# Patient Record
Sex: MALE | Race: NATIVE HAWAIIAN OR OTHER PACIFIC ISLANDER | NOT HISPANIC OR LATINO | Employment: FULL TIME | URBAN - METROPOLITAN AREA
[De-identification: names, ages, dates, MRNs, and addresses within clinical notes are randomized per-mention and may not be internally consistent; named-entity substitution may affect disease eponyms.]

---

## 2020-06-02 ENCOUNTER — OFFICE VISIT (OUTPATIENT)
Dept: URGENT CARE | Facility: PHYSICIAN GROUP | Age: 45
End: 2020-06-02
Payer: COMMERCIAL

## 2020-06-02 VITALS
HEART RATE: 74 BPM | HEIGHT: 70 IN | DIASTOLIC BLOOD PRESSURE: 74 MMHG | RESPIRATION RATE: 16 BRPM | TEMPERATURE: 98 F | SYSTOLIC BLOOD PRESSURE: 122 MMHG | OXYGEN SATURATION: 96 % | BODY MASS INDEX: 25.62 KG/M2 | WEIGHT: 179 LBS

## 2020-06-02 DIAGNOSIS — L08.9 SKIN INFECTION OF RIGHT KNEE: ICD-10-CM

## 2020-06-02 DIAGNOSIS — Z11.3 ROUTINE SCREENING FOR STI (SEXUALLY TRANSMITTED INFECTION): ICD-10-CM

## 2020-06-02 PROCEDURE — 99203 OFFICE O/P NEW LOW 30 MIN: CPT | Performed by: NURSE PRACTITIONER

## 2020-06-02 RX ORDER — DOXYCYCLINE HYCLATE 100 MG/1
100 CAPSULE ORAL 2 TIMES DAILY
Qty: 14 CAP | Refills: 0 | Status: SHIPPED | OUTPATIENT
Start: 2020-06-02 | End: 2020-06-09

## 2020-06-02 ASSESSMENT — ENCOUNTER SYMPTOMS
FEVER: 0
HEADACHES: 0
MYALGIAS: 0
CHILLS: 0

## 2020-06-02 NOTE — PROGRESS NOTES
Subjective:      Lee Lee is a 45 y.o. male who presents with Knee Injury (Rt knee wound  x4days )            HPI New. 45 year old male with open wound to right knee since Sunday. He apparently cut this on metal bar. States reopened today and just would like this checked out. Denies any pain, redness or discharge. He has no fever, chills or myalgia. Additionally he is requesting routine blood work for STI. He is in monogamous relationship.  Patient has no known allergies.  No current outpatient medications on file prior to visit.     No current facility-administered medications on file prior to visit.      Social History     Socioeconomic History   • Marital status: Single     Spouse name: Not on file   • Number of children: Not on file   • Years of education: Not on file   • Highest education level: Not on file   Occupational History   • Not on file   Social Needs   • Financial resource strain: Not on file   • Food insecurity     Worry: Not on file     Inability: Not on file   • Transportation needs     Medical: Not on file     Non-medical: Not on file   Tobacco Use   • Smoking status: Never Smoker   • Smokeless tobacco: Never Used   Substance and Sexual Activity   • Alcohol use: Yes     Comment: Occ   • Drug use: Never   • Sexual activity: Not on file   Lifestyle   • Physical activity     Days per week: Not on file     Minutes per session: Not on file   • Stress: Not on file   Relationships   • Social connections     Talks on phone: Not on file     Gets together: Not on file     Attends [employer] service: Not on file     Active member of club or organization: Not on file     Attends meetings of clubs or organizations: Not on file     Relationship status: Not on file   • Intimate partner violence     Fear of current or ex partner: Not on file     Emotionally abused: Not on file     Physically abused: Not on file     Forced sexual activity: Not on file   Other Topics Concern   • Not on file   Social  "History Narrative   • Not on file     Breast Cancer-related family history is not on file.      Review of Systems   Constitutional: Negative for chills and fever.   Genitourinary: Negative.    Musculoskeletal: Negative for myalgias.   Skin:        Open wound right knee   Neurological: Negative for headaches.          Objective:     /74   Pulse 74   Temp 36.7 °C (98 °F) (Temporal)   Resp 16   Ht 1.778 m (5' 10\")   Wt 81.2 kg (179 lb)   SpO2 96%   BMI 25.68 kg/m²      Physical Exam  Vitals signs and nursing note reviewed.   Constitutional:       Appearance: Normal appearance. He is not ill-appearing.   Cardiovascular:      Rate and Rhythm: Normal rate and regular rhythm.      Heart sounds: No murmur.   Pulmonary:      Effort: Pulmonary effort is normal.      Breath sounds: Normal breath sounds.   Musculoskeletal: Normal range of motion.   Skin:     General: Skin is warm and dry.      Comments: Small open wound to anterior surface of patella. Some mild yellowish discharge noted on exam.    Neurological:      General: No focal deficit present.      Mental Status: He is alert and oriented to person, place, and time.                 Assessment/Plan:       1. Skin infection of right knee  doxycycline (VIBRAMYCIN) 100 MG Cap   2. Routine screening for STI (sexually transmitted infection)  HIV AG/AB COMBO ASSAY DIAGNOSTIC    HEPATITIS PANEL ACUTE(4 COMPONENTS)    RPR    HSV I/II IGG & IGM SERUM     Routine screening per patient request. He does not have a PCP at this time.  Doxy.  Wound care discussed with patient.  Differential diagnosis, natural history, supportive care, and indications for immediate follow-up discussed at length.     "

## 2020-06-05 LAB
HAV IGM SERPL QL IA: NEGATIVE
HBV CORE IGM SERPL QL IA: NEGATIVE
HBV SURFACE AG SERPL QL IA: NEGATIVE
HCV AB S/CO SERPL IA: <0.1 S/CO RATIO (ref 0–0.9)
HIV 1+2 AB+HIV1 P24 AG SERPL QL IA: NON REACTIVE
HSV1 IGG SER IA-ACNC: <0.91 INDEX (ref 0–0.9)
HSV1 IGM TITR SER IF: NORMAL TITER
HSV2 IGG SER IA-ACNC: <0.91 INDEX (ref 0–0.9)
HSV2 IGM TITR SER IF: NORMAL TITER
RPR SER QL: NON REACTIVE

## 2020-06-06 ENCOUNTER — TELEPHONE (OUTPATIENT)
Dept: URGENT CARE | Facility: PHYSICIAN GROUP | Age: 45
End: 2020-06-06

## 2020-06-06 NOTE — TELEPHONE ENCOUNTER
Pt called and asked if you could release his lab records to him on [facility] so that he may have them for himself.

## 2020-06-16 ENCOUNTER — HOSPITAL ENCOUNTER (OUTPATIENT)
Dept: LAB | Facility: MEDICAL CENTER | Age: 45
End: 2020-06-16
Attending: PHYSICIAN ASSISTANT
Payer: COMMERCIAL

## 2020-06-16 ENCOUNTER — OFFICE VISIT (OUTPATIENT)
Dept: MEDICAL GROUP | Facility: PHYSICIAN GROUP | Age: 45
End: 2020-06-16
Payer: COMMERCIAL

## 2020-06-16 VITALS
SYSTOLIC BLOOD PRESSURE: 102 MMHG | BODY MASS INDEX: 24.92 KG/M2 | HEIGHT: 71 IN | OXYGEN SATURATION: 94 % | RESPIRATION RATE: 16 BRPM | TEMPERATURE: 97.8 F | DIASTOLIC BLOOD PRESSURE: 64 MMHG | HEART RATE: 96 BPM | WEIGHT: 178 LBS

## 2020-06-16 DIAGNOSIS — Z91.89 OTHER SPECIFIED PERSONAL RISK FACTORS, NOT ELSEWHERE CLASSIFIED: ICD-10-CM

## 2020-06-16 DIAGNOSIS — Z00.00 GENERAL MEDICAL EXAM: ICD-10-CM

## 2020-06-16 DIAGNOSIS — Z12.5 SPECIAL SCREENING FOR MALIGNANT NEOPLASM OF PROSTATE: ICD-10-CM

## 2020-06-16 DIAGNOSIS — Z23 NEED FOR VACCINATION: ICD-10-CM

## 2020-06-16 DIAGNOSIS — Z82.49 FH: HEART ATTACK: ICD-10-CM

## 2020-06-16 DIAGNOSIS — E55.9 VITAMIN D DEFICIENCY: ICD-10-CM

## 2020-06-16 DIAGNOSIS — S81.031D PUNCTURE WOUND OF RIGHT KNEE, SUBSEQUENT ENCOUNTER: ICD-10-CM

## 2020-06-16 PROBLEM — S81.031A: Status: ACTIVE | Noted: 2020-06-16

## 2020-06-16 LAB
25(OH)D3 SERPL-MCNC: 22 NG/ML (ref 30–100)
ALBUMIN SERPL BCP-MCNC: 4.2 G/DL (ref 3.2–4.9)
ALBUMIN/GLOB SERPL: 1.4 G/DL
ALP SERPL-CCNC: 56 U/L (ref 30–99)
ALT SERPL-CCNC: 22 U/L (ref 2–50)
ANION GAP SERPL CALC-SCNC: 8 MMOL/L (ref 7–16)
AST SERPL-CCNC: 18 U/L (ref 12–45)
BASOPHILS # BLD AUTO: 1 % (ref 0–1.8)
BASOPHILS # BLD: 0.06 K/UL (ref 0–0.12)
BILIRUB SERPL-MCNC: 1.7 MG/DL (ref 0.1–1.5)
BUN SERPL-MCNC: 19 MG/DL (ref 8–22)
CALCIUM SERPL-MCNC: 9.3 MG/DL (ref 8.5–10.5)
CHLORIDE SERPL-SCNC: 100 MMOL/L (ref 96–112)
CHOLEST SERPL-MCNC: 174 MG/DL (ref 100–199)
CO2 SERPL-SCNC: 28 MMOL/L (ref 20–33)
CREAT SERPL-MCNC: 1.09 MG/DL (ref 0.5–1.4)
EOSINOPHIL # BLD AUTO: 0.19 K/UL (ref 0–0.51)
EOSINOPHIL NFR BLD: 3.1 % (ref 0–6.9)
ERYTHROCYTE [DISTWIDTH] IN BLOOD BY AUTOMATED COUNT: 44.1 FL (ref 35.9–50)
FASTING STATUS PATIENT QL REPORTED: NORMAL
GLOBULIN SER CALC-MCNC: 3.1 G/DL (ref 1.9–3.5)
GLUCOSE SERPL-MCNC: 115 MG/DL (ref 65–99)
HCT VFR BLD AUTO: 46.6 % (ref 42–52)
HDLC SERPL-MCNC: 45 MG/DL
HGB BLD-MCNC: 15.6 G/DL (ref 14–18)
IMM GRANULOCYTES # BLD AUTO: 0.03 K/UL (ref 0–0.11)
IMM GRANULOCYTES NFR BLD AUTO: 0.5 % (ref 0–0.9)
LDLC SERPL CALC-MCNC: 110 MG/DL
LYMPHOCYTES # BLD AUTO: 1.72 K/UL (ref 1–4.8)
LYMPHOCYTES NFR BLD: 27.9 % (ref 22–41)
MCH RBC QN AUTO: 30.5 PG (ref 27–33)
MCHC RBC AUTO-ENTMCNC: 33.5 G/DL (ref 33.7–35.3)
MCV RBC AUTO: 91 FL (ref 81.4–97.8)
MONOCYTES # BLD AUTO: 0.45 K/UL (ref 0–0.85)
MONOCYTES NFR BLD AUTO: 7.3 % (ref 0–13.4)
NEUTROPHILS # BLD AUTO: 3.72 K/UL (ref 1.82–7.42)
NEUTROPHILS NFR BLD: 60.2 % (ref 44–72)
NRBC # BLD AUTO: 0 K/UL
NRBC BLD-RTO: 0 /100 WBC
PLATELET # BLD AUTO: 289 K/UL (ref 164–446)
PMV BLD AUTO: 10 FL (ref 9–12.9)
POTASSIUM SERPL-SCNC: 4.2 MMOL/L (ref 3.6–5.5)
PROT SERPL-MCNC: 7.3 G/DL (ref 6–8.2)
PSA SERPL-MCNC: 0.82 NG/ML (ref 0–4)
RBC # BLD AUTO: 5.12 M/UL (ref 4.7–6.1)
SODIUM SERPL-SCNC: 136 MMOL/L (ref 135–145)
TRIGL SERPL-MCNC: 96 MG/DL (ref 0–149)
TSH SERPL DL<=0.005 MIU/L-ACNC: 1.76 UIU/ML (ref 0.38–5.33)
WBC # BLD AUTO: 6.2 K/UL (ref 4.8–10.8)

## 2020-06-16 PROCEDURE — 36415 COLL VENOUS BLD VENIPUNCTURE: CPT

## 2020-06-16 PROCEDURE — 99213 OFFICE O/P EST LOW 20 MIN: CPT | Mod: 25 | Performed by: PHYSICIAN ASSISTANT

## 2020-06-16 PROCEDURE — 90471 IMMUNIZATION ADMIN: CPT | Performed by: PHYSICIAN ASSISTANT

## 2020-06-16 PROCEDURE — 84443 ASSAY THYROID STIM HORMONE: CPT

## 2020-06-16 PROCEDURE — 82306 VITAMIN D 25 HYDROXY: CPT

## 2020-06-16 PROCEDURE — 80053 COMPREHEN METABOLIC PANEL: CPT

## 2020-06-16 PROCEDURE — 85025 COMPLETE CBC W/AUTO DIFF WBC: CPT

## 2020-06-16 PROCEDURE — 84153 ASSAY OF PSA TOTAL: CPT

## 2020-06-16 PROCEDURE — 90715 TDAP VACCINE 7 YRS/> IM: CPT | Performed by: PHYSICIAN ASSISTANT

## 2020-06-16 PROCEDURE — 80061 LIPID PANEL: CPT

## 2020-06-16 SDOH — HEALTH STABILITY: MENTAL HEALTH: HOW MANY STANDARD DRINKS CONTAINING ALCOHOL DO YOU HAVE ON A TYPICAL DAY?: 1 OR 2

## 2020-06-16 SDOH — HEALTH STABILITY: MENTAL HEALTH: HOW OFTEN DO YOU HAVE A DRINK CONTAINING ALCOHOL?: 2-4 TIMES A MONTH

## 2020-06-16 ASSESSMENT — PATIENT HEALTH QUESTIONNAIRE - PHQ9: CLINICAL INTERPRETATION OF PHQ2 SCORE: 0

## 2020-06-16 NOTE — ASSESSMENT & PLAN NOTE
History of Vitamin D deficiency. Has had to take prescription Vit D in the past. Denies any muscle aches or fatigue.

## 2020-06-16 NOTE — ASSESSMENT & PLAN NOTE
On dad's side, significant FH of heart attacks. All of dad's siblings and his father  from heart attack in their early 50's.     Patient has done awesome with lifestyle modifications, he used to weigh 260 lbs over a year ago. Changed his life, eating habits, exercises regularly.

## 2020-06-16 NOTE — ASSESSMENT & PLAN NOTE
Went to , right knee wound. Injury from metal while loading dresser into truck. Healing nicely. No drainage.

## 2020-06-16 NOTE — PROGRESS NOTES
CC:    Chief Complaint   Patient presents with   • Establish Care        HISTORY OF THE PRESENT ILLNESS: Patient is a 45 y.o. male. This pleasant patient is here today establish care.     Health Maintenance: Completed  Update Tdap today.     FH: heart attack  On dad's side, significant FH of heart attacks. All of dad's siblings and his father  from heart attack in their early 50's.     Patient has done awesome with lifestyle modifications, he used to weigh 260 lbs over a year ago. Changed his life, eating habits, exercises regularly.     Vitamin D deficiency  History of Vitamin D deficiency. Has had to take prescription Vit D in the past. Denies any muscle aches or fatigue.     Puncture wound of right knee  Went to , right knee wound. Injury from metal while loading dresser into truck. Healing nicely. No drainage.     Allergies: Patient has no known allergies.    No current [facility]-ordered outpatient medications on file.     No current [facility]-ordered facility-administered medications on file.        History reviewed. No pertinent past medical history.    History reviewed. No pertinent surgical history.    Social History     Tobacco Use   • Smoking status: Never Smoker   • Smokeless tobacco: Never Used   Substance Use Topics   • Alcohol use: Yes     Frequency: 2-4 times a month     Drinks per session: 1 or 2     Comment: maybe 3 beers a week at the most.    • Drug use: Yes     Types: Marijuana     Comment: gummies, maybe once every three months.        Social History     Social History Narrative   • Not on file       Family History   Problem Relation Age of Onset   • Heart Attack Father    • Heart Attack Paternal Uncle    • Heart Attack Paternal Grandfather        ROS:   Constitutional: Patient denies any fever, chills, night sweats, weight loss/gain, fatigue, or malaise.   Eyes: denies any vision changes.   ENT: Patient denies any runny nose, sore throat.   Resp: Patient denies cough, wheezing, or shortness of  "breath.   CV: Patient denies any chest pain, claudication, cyanosis, palpitations, or edema.   GI: Patient denies any constipation, nausea, vomiting, diarrhea, bloating, abdominal pain, or dyspepsia.   MSK: Patient denies any joint pain, cramps, swelling, weakness, stiffness, or tremor  Skin: Patient denies any color changes, skin changes, lesions, ulcerations, wounds, pruritus, hair or nail changes.   Psych: Patient denies any suicidal or homicidal ideation, depression or anxiety.       Exam: /64   Pulse 96   Temp 36.6 °C (97.8 °F) (Temporal)   Resp 16   Ht 1.803 m (5' 11\")   Wt 80.7 kg (178 lb)   SpO2 94%  Body mass index is 24.83 kg/m².      GENERAL: No acute distress, appropriately dressed. Well developed male.   HENT: Atraumatic, normocephalic, EAC's clear without impaction. TM's pearly gray and translucent.   EYES: Extraocular movements intact, pupils equal and reactive to light  NECK: Supple, FROM. No thyromegaly appreciated. No lymphadenopathy (submandibular, anterior/posterior cervical, and supraclavicular lymph nodes palpated) or masses. No bruits appreciated.   CHEST: No deformities, Equal chest expansion  HEART: Regular rate and rhythm, no murmur  RESP: Clear to auscultation bilaterally without wheezes, rales or rhonchi.   ABD: Soft, Nontender, Non-Distended  Extremities: No Clubbing, Cyanosis, or Edema  Skin: Warm/dry, no rashes.   Psych: Normal behavior, normal affect  RIGHT KNEE: Well-healing wound/laceration superior to the patella.  No erythema, no swelling, no discharge.      Assessment/Plan:  1. FH: heart attack  Patient has made dramatic lifestyle modifications.  He used to weigh 260 pounds and is now down to 178 pounds with a healthy diet and exercising regularly.  His father and all his father siblings have all suffered from a heart attack or passed away from heart attack in the early 50s.  We are getting his baseline labs, including cholesterol today.  We also discussed getting a " CT calcium score, with his significant family history I do recommend it.  Gave order today.  2. Vitamin D deficiency  Continue daily supplementation, recommend 2000 IUs a day.  History of having to take 50,000 IUs once a week.  We will get updated lab now.  3. Puncture wound of right knee, subsequent encounter  Well-healing wound right knee superior to the patella.  Patient finished doxycycline, no complications.  4. General medical exam  - CBC WITH DIFFERENTIAL; Future  - Comp Metabolic Panel; Future  - Lipid Profile; Future  - TSH; Future  - VITAMIN D,25 HYDROXY; Future    5. Special screening for malignant neoplasm of prostate  - PROSTATE SPECIFIC AG SCREENING; Future    6. Other specified personal risk factors, not elsewhere classified  - CT-CARDIAC SCORING; Future    7. Need for vaccination  - Tdap Vaccine =>8YO IM       Follow-up: Return in about 4 weeks (around 7/14/2020) for Follow up on labs and CT calcium score. .    Please note that this dictation was created using voice recognition software. I have made every reasonable attempt to correct obvious errors, but I expect that there are errors of grammar and possibly content that I did not discover before finalizing the note.

## 2020-06-17 ENCOUNTER — TELEPHONE (OUTPATIENT)
Dept: MEDICAL GROUP | Facility: PHYSICIAN GROUP | Age: 45
End: 2020-06-17

## 2020-06-17 DIAGNOSIS — R17 ELEVATED BILIRUBIN: ICD-10-CM

## 2020-06-17 DIAGNOSIS — R73.01 IFG (IMPAIRED FASTING GLUCOSE): ICD-10-CM

## 2020-06-17 DIAGNOSIS — E55.9 VITAMIN D DEFICIENCY: ICD-10-CM

## 2020-06-17 DIAGNOSIS — E78.5 DYSLIPIDEMIA: ICD-10-CM

## 2020-06-17 NOTE — TELEPHONE ENCOUNTER
[employer] Sent.      ----- Message from Snow Hodges P.A.-C. sent at 6/17/2020  9:14 AM PDT -----  Please call patient about their results.     Results showed:     Vitamin D is low.  Start supplementing vitamin D 2000 IUs daily.    We will repeat your vitamin D level in 3 months.    Your cholesterol is elevated. I recommend decreasing your intake of saturated fats which are found in meats that come from a cow or pig. Saturated fats are also found in creams, cheeses, butter, mayonnaise, and fried foods. I recommend that you try to eat more vegetables, fruits, fish, and healthy oils like olive oil. Increase fiber intake to 35 grams or more a day. If you do not eat a lot of fiber currently, increase fiber slowly over weeks to reduce bloating and abdominal discomfort. I also recommend moderate intensity exercise like a brisk walk. This exercise should last at least 30 minutes and occur 5 or more days per week.     We will repeat cholesterol in 3 months.    Your labs show that your sugar are elevated. I advise reduced sugar/carbohydrate/alcohol, eat more vegetables and lean meats such as fish/chicken/turkey.     We will repeat labs with a hga1c in 3 months.     The rest your labs look okay.  Your bilirubin is slightly elevated, I suspect this is what is called Gilbert's syndrome.  It is not worrisome at all.    I will mail you a lab slip to get in 3 months and recommend we follow-up then.  Please contact me at any time if you have any questions.    Thank you,    Snow Hodges PA-C